# Patient Record
Sex: FEMALE
[De-identification: names, ages, dates, MRNs, and addresses within clinical notes are randomized per-mention and may not be internally consistent; named-entity substitution may affect disease eponyms.]

---

## 2018-09-05 ENCOUNTER — HOSPITAL ENCOUNTER (EMERGENCY)
Dept: HOSPITAL 92 - ERS | Age: 33
Discharge: HOME | End: 2018-09-05
Payer: MEDICARE

## 2018-09-05 DIAGNOSIS — R07.89: Primary | ICD-10-CM

## 2018-09-05 LAB
ALBUMIN SERPL BCG-MCNC: 4.4 G/DL (ref 3.5–5)
ALP SERPL-CCNC: 67 U/L (ref 40–150)
ALT SERPL W P-5'-P-CCNC: 12 U/L (ref 8–55)
ANION GAP SERPL CALC-SCNC: 13 MMOL/L (ref 10–20)
AST SERPL-CCNC: 14 U/L (ref 5–34)
BASOPHILS # BLD AUTO: 0.1 THOU/UL (ref 0–0.2)
BASOPHILS NFR BLD AUTO: 0.9 % (ref 0–1)
BILIRUB SERPL-MCNC: 0.5 MG/DL (ref 0.2–1.2)
BUN SERPL-MCNC: 12 MG/DL (ref 7–18.7)
CALCIUM SERPL-MCNC: 9.2 MG/DL (ref 7.8–10.44)
CHLORIDE SERPL-SCNC: 106 MMOL/L (ref 98–107)
CK MB SERPL-MCNC: 0.5 NG/ML (ref 0–6.6)
CO2 SERPL-SCNC: 22 MMOL/L (ref 22–29)
CREAT CL PREDICTED SERPL C-G-VRATE: 0 ML/MIN (ref 70–130)
EOSINOPHIL # BLD AUTO: 0.2 THOU/UL (ref 0–0.7)
EOSINOPHIL NFR BLD AUTO: 2.6 % (ref 0–10)
GLOBULIN SER CALC-MCNC: 3.2 G/DL (ref 2.4–3.5)
GLUCOSE SERPL-MCNC: 110 MG/DL (ref 70–105)
HGB BLD-MCNC: 13.4 G/DL (ref 12–16)
LYMPHOCYTES # BLD: 3.7 THOU/UL (ref 1.2–3.4)
LYMPHOCYTES NFR BLD AUTO: 43.9 % (ref 21–51)
MCH RBC QN AUTO: 31.7 PG (ref 27–31)
MCV RBC AUTO: 91.6 FL (ref 78–98)
MONOCYTES # BLD AUTO: 0.4 THOU/UL (ref 0.11–0.59)
MONOCYTES NFR BLD AUTO: 4.4 % (ref 0–10)
NEUTROPHILS # BLD AUTO: 4.1 THOU/UL (ref 1.4–6.5)
NEUTROPHILS NFR BLD AUTO: 48.1 % (ref 42–75)
PLATELET # BLD AUTO: 284 THOU/UL (ref 130–400)
POTASSIUM SERPL-SCNC: 3.4 MMOL/L (ref 3.5–5.1)
RBC # BLD AUTO: 4.24 MILL/UL (ref 4.2–5.4)
SODIUM SERPL-SCNC: 138 MMOL/L (ref 136–145)
TROPONIN I SERPL DL<=0.01 NG/ML-MCNC: (no result) NG/ML (ref ?–0.03)
WBC # BLD AUTO: 8.4 THOU/UL (ref 4.8–10.8)

## 2018-09-05 PROCEDURE — 82553 CREATINE MB FRACTION: CPT

## 2018-09-05 PROCEDURE — 93005 ELECTROCARDIOGRAM TRACING: CPT

## 2018-09-05 PROCEDURE — 36415 COLL VENOUS BLD VENIPUNCTURE: CPT

## 2018-09-05 PROCEDURE — 71045 X-RAY EXAM CHEST 1 VIEW: CPT

## 2018-09-05 PROCEDURE — 85025 COMPLETE CBC W/AUTO DIFF WBC: CPT

## 2018-09-05 PROCEDURE — 84484 ASSAY OF TROPONIN QUANT: CPT

## 2018-09-05 PROCEDURE — 80053 COMPREHEN METABOLIC PANEL: CPT

## 2018-09-05 NOTE — RAD
SINGLE VIEW OF THE CHEST:

 

Comparison: 4-11-17

 

History: Chest pain for four days. 

 

FINDINGS:

Single view of the chest shows a normal sized cardiomediastinal silhouette. There is no evidence of c
onsolidation, mass, or pleural effusion. The bones are unremarkable.

 

IMPRESSION:

No evidence of acute cardiopulmonary disease.

 

 

 

POS: [facility]

## 2018-09-08 NOTE — EKG
Test Reason : 

Blood Pressure : ***/*** mmHG

Vent. Rate : 079 BPM     Atrial Rate : 079 BPM

   P-R Int : 128 ms          QRS Dur : 132 ms

    QT Int : 430 ms       P-R-T Axes : 048 004 -10 degrees

   QTc Int : 493 ms

 

Normal sinus rhythm

Right bundle branch block

Abnormal ECG

 

Confirmed by ALEIDA HUNTER DO ([id number]),  MIRYAM GENTILE ([id number]) on 9/8/2018 9:20:10 PM

 

Referred By:             Confirmed By:ALEIDA HUNTER DO

## 2019-12-11 ENCOUNTER — HOSPITAL ENCOUNTER (EMERGENCY)
Dept: HOSPITAL 92 - ERS | Age: 34
Discharge: HOME | End: 2019-12-11
Payer: MEDICARE

## 2019-12-11 DIAGNOSIS — S30.1XXA: ICD-10-CM
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
## 2019-12-11 DIAGNOSIS — S40.011A: Primary | ICD-10-CM

## 2019-12-11 LAB
PREGU CONTROL BACKGROUND?: (no result)
PREGU CONTROL BAR APPEAR?: YES
PROT UR STRIP.AUTO-MCNC: 10 MG/DL
WBC UR QL AUTO: (no result) HPF (ref 0–3)

## 2019-12-11 PROCEDURE — 81025 URINE PREGNANCY TEST: CPT

## 2019-12-11 PROCEDURE — 96374 THER/PROPH/DIAG INJ IV PUSH: CPT

## 2019-12-11 PROCEDURE — 74177 CT ABD & PELVIS W/CONTRAST: CPT

## 2019-12-11 PROCEDURE — 81015 MICROSCOPIC EXAM OF URINE: CPT

## 2019-12-11 PROCEDURE — 81003 URINALYSIS AUTO W/O SCOPE: CPT

## 2019-12-11 NOTE — RAD
RIGHT SHOULDER THREE VIEWS:

 

HISTORY:

Injury. Right shoulder pain.

 

FINDINGS:

No acute fracture or dislocation is identified.

 

POS: [facility]

## 2019-12-11 NOTE — CT
EXAM:

CT abdomen and pelvis with IV contrast



PROVIDED CLINICAL HISTORY:

Trauma



COMPARISON:

None



FINDINGS:

The visualized lung bases are free of significant opacity.



The solid abdominal organs demonstrate an unremarkable CT appearance. Changes of prior cholecystectom
y are seen.



There is no bowel dilatation, intraperitoneal inflammatory fat stranding, free fluid or free air appa
rent. There is no evidence for appendicitis. There is mild reticulation of the subcutaneous adipose

layer of the left lateral abdominal wall, presumably reflecting bruising.



No regional lymph node enlargement apparent. The regional major vascular structures appear unremarkab
le. The osseous structures demonstrate no concerning lytic or blastic lesions.



Coronal and sagittal spinal reconstructions demonstrate maintenance of vertebral body heights and nor
mal spinal alignment. There is no evidence for fracture.



IMPRESSION:

Bruising involving the subcutaneous adipose layer of the left lateral abdominal wall. No additional e
vidence for traumatic abnormality involving the abdomen and pelvis.



Reported By: Chato Anthony 

Electronically Signed:  12/11/2019 5:22 PM

## 2020-12-03 ENCOUNTER — HOSPITAL ENCOUNTER (OUTPATIENT)
Dept: HOSPITAL 92 - BICMRI | Age: 35
Discharge: HOME | End: 2020-12-03
Attending: OPHTHALMOLOGY
Payer: MEDICARE

## 2020-12-03 DIAGNOSIS — H47.20: Primary | ICD-10-CM

## 2020-12-03 PROCEDURE — A9579 GAD-BASE MR CONTRAST NOS,1ML: HCPCS

## 2020-12-03 PROCEDURE — 70553 MRI BRAIN STEM W/O & W/DYE: CPT

## 2020-12-03 NOTE — MRI
MRI OF BRAIN WITH AND WITHOUT CONTRAST:

12/3/20

 

INDICATIONS:

Optic disc atrophy. 

 

Comparison made to prior MRI of brain exams dated 3/23/17, 8/12/13 and 12/11/09.

 

FINDINGS: 

Ventricles have normal size and position. There is no evidence of restricted diffusion. There is no e
vidence of mass or edema within the brain parenchyma. There is no evidence of white matter abnormalit
y. 

 

Evaluation of the orbits was performed with multiplanar and multisequential imaging in sagittal and c
oronal planes. 

 

No abnormal enhancement seen within the brain. 

 

The orbits are unremarkable. The extra-ocular muscles appear normal and symmetric. Globes are unremar
kable. Pituitary is unremarkable. Optic nerves appear normal and symmetric. Ophthalmic veins are norm
al in appearance. Lacrimal glands are normal in appearance. 

 

IMPRESSION: 

Unremarkable MRI of the brain and orbits. 

 

POS: [facility]